# Patient Record
Sex: FEMALE | Race: WHITE | NOT HISPANIC OR LATINO | Employment: FULL TIME | URBAN - METROPOLITAN AREA
[De-identification: names, ages, dates, MRNs, and addresses within clinical notes are randomized per-mention and may not be internally consistent; named-entity substitution may affect disease eponyms.]

---

## 2017-01-11 ENCOUNTER — PATIENT MESSAGE (OUTPATIENT)
Dept: OBSTETRICS AND GYNECOLOGY | Facility: CLINIC | Age: 32
End: 2017-01-11

## 2017-03-17 ENCOUNTER — PATIENT MESSAGE (OUTPATIENT)
Dept: OBSTETRICS AND GYNECOLOGY | Facility: CLINIC | Age: 32
End: 2017-03-17

## 2017-04-17 ENCOUNTER — PATIENT MESSAGE (OUTPATIENT)
Dept: OPTOMETRY | Facility: CLINIC | Age: 32
End: 2017-04-17

## 2017-05-22 ENCOUNTER — OFFICE VISIT (OUTPATIENT)
Dept: OBSTETRICS AND GYNECOLOGY | Facility: CLINIC | Age: 32
End: 2017-05-22
Attending: OBSTETRICS & GYNECOLOGY
Payer: COMMERCIAL

## 2017-05-22 VITALS
DIASTOLIC BLOOD PRESSURE: 72 MMHG | BODY MASS INDEX: 38.42 KG/M2 | HEIGHT: 65 IN | SYSTOLIC BLOOD PRESSURE: 118 MMHG | WEIGHT: 230.63 LBS

## 2017-05-22 DIAGNOSIS — Z01.419 ENCOUNTER FOR GYNECOLOGICAL EXAMINATION WITHOUT ABNORMAL FINDING: ICD-10-CM

## 2017-05-22 DIAGNOSIS — Z12.4 SCREENING FOR MALIGNANT NEOPLASM OF THE CERVIX: Primary | ICD-10-CM

## 2017-05-22 PROCEDURE — 99395 PREV VISIT EST AGE 18-39: CPT | Mod: S$GLB,,, | Performed by: OBSTETRICS & GYNECOLOGY

## 2017-05-22 PROCEDURE — 99999 PR PBB SHADOW E&M-EST. PATIENT-LVL III: CPT | Mod: PBBFAC,,, | Performed by: OBSTETRICS & GYNECOLOGY

## 2017-05-22 PROCEDURE — 88175 CYTOPATH C/V AUTO FLUID REDO: CPT

## 2017-05-22 NOTE — PROGRESS NOTES
SUBJECTIVE:   31 y.o. female   for annual routine Pap and checkup. Patient's last menstrual period was 05/15/2017..  She has no unusual complaints and reports her cycles are now 21 days. .        Past Medical History:   Diagnosis Date    Allergic rhinitis      Past Surgical History:   Procedure Laterality Date    ADENOIDECTOMY      palstic surgery on her head as a child      scalp hair tiessue expander brith defect repair     TONSILLECTOMY       Social History     Social History    Marital status: Single     Spouse name: N/A    Number of children: N/A    Years of education: N/A     Occupational History    Not on file.     Social History Main Topics    Smoking status: Never Smoker    Smokeless tobacco: Never Used    Alcohol use Yes      Comment: socially    Drug use: No    Sexual activity: Yes     Partners: Male     Birth control/ protection: OCP     Other Topics Concern    Not on file     Social History Narrative    No narrative on file     Family History   Problem Relation Age of Onset    Hypertension Mother     Diabetes Mother     Hyperlipidemia Mother     Hypertension Father     Diabetes Father     Hyperlipidemia Father     Heart attack Maternal Grandfather     Diabetes Paternal Grandfather     Heart disease Paternal Grandfather     Heart attack Paternal Grandfather     Cancer Maternal Grandmother      non hodkins lymphoma     Cancer Paternal Grandmother      breast and cervical cancer     Heart disease Paternal Grandmother     Hyperlipidemia Paternal Grandmother     Dementia Paternal Grandmother     Amblyopia Neg Hx     Blindness Neg Hx     Macular degeneration Neg Hx     Retinal detachment Neg Hx     Strabismus Neg Hx     Stroke Neg Hx     Thyroid disease Neg Hx     Colon cancer Neg Hx     Ovarian cancer Neg Hx      OB History    Para Term  AB Living   0        SAB TAB Ectopic Multiple Live Births                      Current Outpatient Prescriptions    Medication Sig Dispense Refill    fluticasone (FLONASE) 50 mcg/actuation nasal spray 1 spray by Each Nare route daily as needed for Rhinitis. 16 g 6    norgestimate-ethinyl estradiol (ORTHO-CYCLEN) 0.25-35 mg-mcg per tablet Take 1 tablet by mouth once daily.       No current facility-administered medications for this visit.      Allergies: Penicillins     ROS:  Constitutional: no weight loss, weight gain, fever, fatigue  Eyes:  No vision changes, glasses/contacts  ENT/Mouth: No ulcers, sinus problems, ears ringing, headache  Cardiovascular: No inability to lie flat, chest pain, exercise intolerance, swelling, heart palpitations  Respiratory: No wheezing, coughing blood, shortness of breath, or cough  Gastrointestinal: No diarrhea, bloody stool, nausea/vomiting, constipation, gas, hemorrhoids  Genitourinary: No blood in urine, painful urination, urgency of urination, frequency of urination, incomplete emptying, incontinence, abnormal bleeding, painful periods, heavy periods, vaginal discharge, vaginal odor, painful intercourse, sexual problems, bleeding after intercourse.  Musculoskeletal: No muscle weakness  Skin/Breast: No painful breasts, nipple discharge, masses, rash, ulcers  Neurological: No passing out, seizures, numbness, headache  Endocrine: No diabetes, hypothyroid, hyperthyroid, hot flashes, hair loss, abnormal hair growth, acne  Psychiatric: No depression, crying  Hematologic: No bruises, bleeding, swollen lymph nodes, anemia.      Physical Exam:   Constitutional: She is oriented to person, place, and time. She appears well-developed and well-nourished.      Neck: Normal range of motion. No tracheal deviation present. No thyromegaly present.    Cardiovascular: Exam reveals no edema.     Pulmonary/Chest: Effort normal. She exhibits no mass, no tenderness, no deformity and no retraction. Right breast exhibits no inverted nipple, no mass, no nipple discharge, no skin change, no tenderness, presence, no  bleeding and no swelling. Left breast exhibits no inverted nipple, no mass, no nipple discharge, no skin change, no tenderness, presence, no bleeding and no swelling. Breasts are symmetrical.        Abdominal: Soft. She exhibits no distension and no mass. There is no tenderness. There is no rebound and no guarding. No hernia. Hernia confirmed negative in the left inguinal area.     Genitourinary: Vagina normal and uterus normal. Rectal exam shows no external hemorrhoid. There is no rash, tenderness or lesion on the right labia. There is no rash, tenderness or lesion on the left labia. Uterus is not deviated. Cervix is normal. No no adexnal prolapse. Right adnexum displays no mass, no tenderness and no fullness. Left adnexum displays no mass, no tenderness and no fullness. No tenderness, bleeding, rectocele, cystocele or unspecified prolapse of vaginal walls in the vagina. No vaginal discharge found. Cervix exhibits no motion tenderness, no discharge and no friability.           Musculoskeletal: Normal range of motion and moves all extremeties. She exhibits no edema.      Lymphadenopathy:        Right: No inguinal adenopathy present.        Left: No inguinal adenopathy present.    Neurological: She is alert and oriented to person, place, and time.    Skin: No rash noted. No erythema. No pallor.    Psychiatric: She has a normal mood and affect. Her behavior is normal. Judgment and thought content normal.         ASSESSMENT:   well woman  no contraindication to begin use of oral contraceptives    PLAN:   pap smear  return annually or prn  Patient desires refills of OCPs- she may or may not start them

## 2017-05-30 ENCOUNTER — OFFICE VISIT (OUTPATIENT)
Dept: OPTOMETRY | Facility: CLINIC | Age: 32
End: 2017-05-30

## 2017-05-30 ENCOUNTER — OFFICE VISIT (OUTPATIENT)
Dept: OPTOMETRY | Facility: CLINIC | Age: 32
End: 2017-05-30
Payer: COMMERCIAL

## 2017-05-30 DIAGNOSIS — H52.203 MYOPIA WITH ASTIGMATISM, BILATERAL: ICD-10-CM

## 2017-05-30 DIAGNOSIS — Z01.00 EXAMINATION OF EYES AND VISION: Primary | ICD-10-CM

## 2017-05-30 DIAGNOSIS — H52.13 MYOPIA WITH ASTIGMATISM, BILATERAL: ICD-10-CM

## 2017-05-30 DIAGNOSIS — Z46.0 ENCOUNTER FOR FITTING OR ADJUSTMENT OF SPECTACLES OR CONTACT LENSES: Primary | ICD-10-CM

## 2017-05-30 PROCEDURE — 92015 DETERMINE REFRACTIVE STATE: CPT | Mod: S$GLB,,, | Performed by: OPTOMETRIST

## 2017-05-30 PROCEDURE — 99999 PR PBB SHADOW E&M-EST. PATIENT-LVL III: CPT | Mod: PBBFAC,,, | Performed by: OPTOMETRIST

## 2017-05-30 PROCEDURE — 99499 UNLISTED E&M SERVICE: CPT | Mod: S$GLB,,, | Performed by: OPTOMETRIST

## 2017-05-30 PROCEDURE — 92310 CONTACT LENS FITTING OU: CPT | Mod: S$GLB,,, | Performed by: OPTOMETRIST

## 2017-05-30 PROCEDURE — 92014 COMPRE OPH EXAM EST PT 1/>: CPT | Mod: S$GLB,,, | Performed by: OPTOMETRIST

## 2017-05-30 NOTE — PATIENT INSTRUCTIONS
Good ocular health in each eye.  High myopia with astigmatism in each eye (greater astigmatic correction in the right eye).  Very satisfactory correctable visual acuity in each eye.  New spectacle lens Rx issued for use in lieu of CLs.    Wearing toric SCLs.  Good CL fit in each eye.  Wearing CLs well in each eye.  Showing need for only minor power change in the right CL, per over-refraction done today.  Power of left lens okay as is.  New contact lens Rx issued.    Recheck in one year, or prior if any problems in the interim.

## 2017-05-30 NOTE — PROGRESS NOTES
HPI     Concerns About Ocular Health    Additional comments: Eye exam and refraction, and contact lens follow-up.              Comments   Patient's age: 31 y.o. female  Occupation: [employer]  Approximate date of last eye examination:  10/13/2015  Name of last eye doctor seen: Dr Palmer  City/State: [employer] [facility]  Wears glasses? Yes     If yes, wears  Full-time or part-time?  Part time  Present glasses are: Bifocal, SV Distance, SV Reading?  Single vision   distance   Approximate age of present glasses:  2 years   Got new glasses following last exam, or subsequently?:  Yes   Any problem with [facility] with glasses?  No  Wears CLs?:  Yes           If yes:              Type of CL worn:  Acuvue Oasys for Astigmatism                                                OD  8.6   14.5   -7.50 -1.75   x 160                                               OS   8.6   14.5   -7.50 -0.75   x 040              Wears full-time or part-time:  Full-time               Sleeps with contact lenses:  No               CL Solution used:  Opti-free Replenish               How often replace CLs:  Monthly              Any problem with [facility] with CLs?  No              Has patient read and signed the contact lens fee information   document? Yes  Headaches?  No  Eye pain/discomfort? No                                                                            Flashes?  No  Floaters?  No  Diplopia/Double vision?  No  Patient's Ocular History:         Any eye surgeries? No         Any eye injury?  No         Any treatment for eye disease?  No  Family history of eye disease? No  Significant patient medical history:         1. Diabetes?  No   2. HBP?  No                 ! OTC eyedrops currently using:  Systane usually BID OU   ! Prescription eye meds currently using:  None   ! Any history of allergy/adverse reaction to any eye meds used   previously?  No    ! Any history of allergy/adverse reaction to eyedrops used during prior   eye exam(s)? No    ! Any history of  allergy/adverse reaction to Novacaine or similar meds?   No   ! Any history of allergy/adverse reaction to Epinephrine or similar meds?   No    ! Patient okay with use of anesthetic eyedrops to check eye pressure?    Yes        ! Patient okay with use of eyedrops to dilate pupils today?  Yes, use a   mild drop   !  Allergies/Medications/Medical History/Family History reviewed today?    Yes      PD =   58/54                                                                      Last edited by Aamir Palmer, OD on 5/30/2017 10:40 AM. (History)            Assessment /Plan     For exam results, see Encounter Report.    1. Examination of eyes and vision     2. Myopia with astigmatism, bilateral                      Good ocular health in each eye.  High myopia with astigmatism in each eye (greater astigmatic correction in the right eye).  Very satisfactory correctable visual acuity in each eye.  New spectacle lens Rx issued for use in lieu of CLs.    Wearing toric SCLs.  Good CL fit in each eye.  Wearing CLs well in each eye.  Showing need for only minor power change in the right CL, per over-refraction done today.  Power of left lens okay as is.  New contact lens Rx issued.    Recheck in one year, or prior if any problems in the interim.

## 2017-05-30 NOTE — PROGRESS NOTES
HPI     Contact Lens Follow Up    Additional comments: contact lens follow-up with general eye exam           Comments   Patient in today for contact lens follow-up with general eye examination.    Refer to additional patient encounter notes dated 05/30/2017.         Last edited by Aamir Palmer, OD on 5/30/2017 10:35 AM. (History)            Assessment /Plan     For exam results, see Encounter Report.    1. Encounter for fitting or adjustment of spectacles or contact lenses                      Good ocular health in each eye.  High myopia with astigmatism in each eye (greater astigmatic correction in the right eye).  Very satisfactory correctable visual acuity in each eye.  New spectacle lens Rx issued for use in lieu of CLs.    Wearing toric SCLs.  Good CL fit in each eye.  Wearing CLs well in each eye.  Showing need for only minor power change in the right CL, per over-refraction done today.  Power of left lens okay as is.  New contact lens Rx issued.    Recheck in one year, or prior if any problems in the interim.

## 2017-08-29 ENCOUNTER — PATIENT MESSAGE (OUTPATIENT)
Dept: OBSTETRICS AND GYNECOLOGY | Facility: CLINIC | Age: 32
End: 2017-08-29

## 2017-10-12 ENCOUNTER — OFFICE VISIT (OUTPATIENT)
Dept: SURGERY | Facility: CLINIC | Age: 32
End: 2017-10-12
Payer: COMMERCIAL

## 2017-10-12 DIAGNOSIS — Z80.3 FAMILY HISTORY OF BREAST CANCER: ICD-10-CM

## 2017-10-12 DIAGNOSIS — Z71.83 ENCOUNTER FOR NONPROCREATIVE GENETIC COUNSELING: Primary | ICD-10-CM

## 2017-10-12 PROCEDURE — 99202 OFFICE O/P NEW SF 15 MIN: CPT | Mod: S$GLB,,, | Performed by: NURSE PRACTITIONER

## 2017-10-12 NOTE — PROGRESS NOTES
Mrs Akers presents for genetic counseling, self referred, also patient of Dr Fermin. She is a 31 year old female with no personal history of cancer. See pedigree for full family history which will be scanned into Epic media. Her paternal family is from [place] with her living great paternal aunts residing in that country. This patient does not have a known hereditary cancer genetic mutation on either side of the family and does not have known Ashenazi Sikh ancestry.      We reviewed her medical and family history and discussed the genetics of breast cancer, cancer risks associated with a hereditary predisposition to cancer, and the benefits, risks, and limitations of genetic testing according to current NCCN guidelines.  Discussed sporadic verses family clustering verses hereditary cancer. The patients paternal family history raises the possibility of a genetic susceptibility to breast cancer and ovarian cancer, with the two genes most strongly associated with early-onset breast cancer and ovarian cancer being BRCA1 and BRCA2. Mutations in these genes increase the risk for both breast and ovarian cancer in women and breast and early prostate cancer in men, along with an elevated risk of pancreatic cancer and melanoma. Due to significant clinical overlap in hereditary cancer susceptibility genes, a molecular diagnosis of a hereditary cancer condition is necessary to clarify the cancer risks this patient and multigene testing was discussed based on her history of malignancies reported today. Genetic testing for mutations in the BRCA1 and BRCA2 genes involves the complete sequencing of both BRCA1 and BRCA2, testing for 5 large gene rearrangement mutations in the BRCA1 gene, and the BRACAnalysis Large Rearrangement Test (LEE accounts for 6-10% of all mutations in HBOC). This testing is estimated to identify greater than 92% of mutations in the BRCA1 and BRCA2 genes.     Initial testing is recommended for living  affected relative with early onset breast cancer (paternal great aunt) however she does not live in the [place] and this patient states she is uncertain if genetic testing is even available in [place]. The next most appropriate relative to pursue genetic testing would be a first degree relative to those affected with at risk cancers (her father or paternal aunts). Discussed at great length rationale for this recommendation and current NCCN guidelines. Also discussed limitations of a negative result in someone unaffected with cancer. She states she will discuss with her father and contact me if he desires an appointment with me for genetic counseling.     We also discussed her maternal family history is suggestive of a sporadic occurrence of breast cancer in her mother and that genetic testing would not be recommended for her maternal family.     Also discussed breast cancer screening recommendations according to current NCCN and ACS guidelines. Unless a known inherited pathogenic mutation is detected in this patient, I advised annual mammogram at 39 and may also consider breast MRI at that time. No indication to begin those screenings earlier based on her first degree relative's age of onset (mother at 61).     She will contact me with any results of genetic testing in her family. If her father is unavailable for testing, I would proceed with testing this patient and she was informed her insurance may or may not cover the cost of testing.     Time in counseling 40 min, total time 40 min

## 2017-11-15 ENCOUNTER — PATIENT MESSAGE (OUTPATIENT)
Dept: INTERNAL MEDICINE | Facility: CLINIC | Age: 32
End: 2017-11-15

## 2017-11-16 DIAGNOSIS — Z00.00 ANNUAL PHYSICAL EXAM: Primary | ICD-10-CM

## 2017-11-20 ENCOUNTER — LAB VISIT (OUTPATIENT)
Dept: LAB | Facility: HOSPITAL | Age: 32
End: 2017-11-20
Attending: INTERNAL MEDICINE
Payer: COMMERCIAL

## 2017-11-20 DIAGNOSIS — Z00.00 ANNUAL PHYSICAL EXAM: ICD-10-CM

## 2017-11-20 LAB
ALBUMIN SERPL BCP-MCNC: 3.6 G/DL
ALP SERPL-CCNC: 61 U/L
ALT SERPL W/O P-5'-P-CCNC: 11 U/L
ANION GAP SERPL CALC-SCNC: 6 MMOL/L
AST SERPL-CCNC: 14 U/L
BASOPHILS # BLD AUTO: 0.06 K/UL
BASOPHILS NFR BLD: 0.6 %
BILIRUB DIRECT SERPL-MCNC: 0.2 MG/DL
BILIRUB SERPL-MCNC: 0.4 MG/DL
BUN SERPL-MCNC: 11 MG/DL
CALCIUM SERPL-MCNC: 9.3 MG/DL
CHLORIDE SERPL-SCNC: 103 MMOL/L
CHOLEST SERPL-MCNC: 226 MG/DL
CHOLEST/HDLC SERPL: 4.7 {RATIO}
CO2 SERPL-SCNC: 28 MMOL/L
CREAT SERPL-MCNC: 0.7 MG/DL
DIFFERENTIAL METHOD: ABNORMAL
EOSINOPHIL # BLD AUTO: 0.2 K/UL
EOSINOPHIL NFR BLD: 1.7 %
ERYTHROCYTE [DISTWIDTH] IN BLOOD BY AUTOMATED COUNT: 13.1 %
EST. GFR  (AFRICAN AMERICAN): >60 ML/MIN/1.73 M^2
EST. GFR  (NON AFRICAN AMERICAN): >60 ML/MIN/1.73 M^2
ESTIMATED AVG GLUCOSE: 111 MG/DL
GLUCOSE SERPL-MCNC: 109 MG/DL
HBA1C MFR BLD HPLC: 5.5 %
HCT VFR BLD AUTO: 36.4 %
HDLC SERPL-MCNC: 48 MG/DL
HDLC SERPL: 21.2 %
HGB BLD-MCNC: 11.7 G/DL
IMM GRANULOCYTES # BLD AUTO: 0.06 K/UL
IMM GRANULOCYTES NFR BLD AUTO: 0.6 %
LDLC SERPL CALC-MCNC: 134.8 MG/DL
LYMPHOCYTES # BLD AUTO: 3 K/UL
LYMPHOCYTES NFR BLD: 29.7 %
MCH RBC QN AUTO: 27.1 PG
MCHC RBC AUTO-ENTMCNC: 32.1 G/DL
MCV RBC AUTO: 85 FL
MONOCYTES # BLD AUTO: 0.6 K/UL
MONOCYTES NFR BLD: 6 %
NEUTROPHILS # BLD AUTO: 6.1 K/UL
NEUTROPHILS NFR BLD: 61.4 %
NONHDLC SERPL-MCNC: 178 MG/DL
NRBC BLD-RTO: 0 /100 WBC
PLATELET # BLD AUTO: 295 K/UL
PMV BLD AUTO: 9.1 FL
POTASSIUM SERPL-SCNC: 4.1 MMOL/L
PROT SERPL-MCNC: 7.6 G/DL
RBC # BLD AUTO: 4.31 M/UL
SODIUM SERPL-SCNC: 137 MMOL/L
TRIGL SERPL-MCNC: 216 MG/DL
TSH SERPL DL<=0.005 MIU/L-ACNC: 1.12 UIU/ML
WBC # BLD AUTO: 9.97 K/UL

## 2017-11-20 PROCEDURE — 80061 LIPID PANEL: CPT

## 2017-11-20 PROCEDURE — 80048 BASIC METABOLIC PNL TOTAL CA: CPT

## 2017-11-20 PROCEDURE — 36415 COLL VENOUS BLD VENIPUNCTURE: CPT

## 2017-11-20 PROCEDURE — 85025 COMPLETE CBC W/AUTO DIFF WBC: CPT

## 2017-11-20 PROCEDURE — 80076 HEPATIC FUNCTION PANEL: CPT

## 2017-11-20 PROCEDURE — 84443 ASSAY THYROID STIM HORMONE: CPT

## 2017-11-20 PROCEDURE — 83036 HEMOGLOBIN GLYCOSYLATED A1C: CPT

## 2017-12-05 ENCOUNTER — OFFICE VISIT (OUTPATIENT)
Dept: INTERNAL MEDICINE | Facility: CLINIC | Age: 32
End: 2017-12-05
Payer: COMMERCIAL

## 2017-12-05 VITALS
WEIGHT: 226.19 LBS | HEART RATE: 84 BPM | SYSTOLIC BLOOD PRESSURE: 132 MMHG | DIASTOLIC BLOOD PRESSURE: 64 MMHG | HEIGHT: 65 IN | BODY MASS INDEX: 37.69 KG/M2

## 2017-12-05 DIAGNOSIS — D64.9 ANEMIA, UNSPECIFIED TYPE: ICD-10-CM

## 2017-12-05 DIAGNOSIS — Z00.00 ANNUAL PHYSICAL EXAM: Primary | ICD-10-CM

## 2017-12-05 DIAGNOSIS — J31.0 RHINITIS, UNSPECIFIED CHRONICITY, UNSPECIFIED TYPE: ICD-10-CM

## 2017-12-05 DIAGNOSIS — R00.2 PALPITATION: ICD-10-CM

## 2017-12-05 PROCEDURE — 99999 PR PBB SHADOW E&M-EST. PATIENT-LVL III: CPT | Mod: PBBFAC,,, | Performed by: INTERNAL MEDICINE

## 2017-12-05 PROCEDURE — 99395 PREV VISIT EST AGE 18-39: CPT | Mod: S$GLB,,, | Performed by: INTERNAL MEDICINE

## 2017-12-05 RX ORDER — FLUTICASONE PROPIONATE 50 MCG
1 SPRAY, SUSPENSION (ML) NASAL DAILY PRN
Qty: 16 G | Refills: 6 | Status: SHIPPED | OUTPATIENT
Start: 2017-12-05

## 2017-12-05 NOTE — PROGRESS NOTES
"Subjective:       Patient ID: Cami Akers is a 31 y.o. female.    Chief Complaint: Annual Exam   31 year old presents for physica. She has been well overall realized she had not been in for some time. She wanted to heave her thyroid checked and get some bloodwork. She  Had episode of palpiation last month . She cut back on caffeine and stopped it. Her symtpoms resolved. She denies cp or sob.   She has occasional seasonal allergies relief with flonase would like refill. She did undergo genetic appt for counseing she reports sent by her gyn due to family history but it was mostly on her fathers side. 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   Respiratory: Negative for cough, shortness of breath and wheezing.    Cardiovascular: Negative for chest pain.        Episode of palpiatoins last month resolved  With stopping caffein   Gastrointestinal: Negative for abdominal pain and constipation.   Skin:        Dry skin two toes   Bleeds at times    Neurological: Negative for dizziness.       Objective:     Blood pressure 132/64, pulse 84, height 5' 5" (1.651 m), weight 102.6 kg (226 lb 3.1 oz).    Physical Exam   Constitutional: No distress.   HENT:   Head: Normocephalic.   Mouth/Throat: Oropharynx is clear and moist.   Eyes: No scleral icterus.   Neck: Neck supple.   Cardiovascular: Normal rate, regular rhythm and normal heart sounds.  Exam reveals no gallop and no friction rub.    No murmur heard.  Pulmonary/Chest: Effort normal and breath sounds normal. No respiratory distress.   Breast : normal no masses or tenderness    Abdominal: Soft. Bowel sounds are normal. She exhibits no mass. There is no tenderness.   Musculoskeletal: She exhibits no edema.   Neurological: She is alert.   Skin: No erythema.   Dry hypertrophic skin two toes  Right    Psychiatric: She has a normal mood and affect.   Vitals reviewed.      Assessment:     "   1. Annual physical exam    2. Rhinitis, unspecified chronicity, unspecified type    3. Anemia, unspecified type    4. Palpitation        Plan:       Cami was seen today for annual exam.    Diagnoses and all orders for this visit:    Annual physical exam    Rhinitis, unspecified chronicity, unspecified type  -     fluticasone (FLONASE) 50 mcg/actuation nasal spray; 1 spray by Each Nare route daily as needed for Rhinitis.    Anemia, unspecified type  Discussed consider mvi with iron  Recheck in 2 months   -     CBC auto differential; Future  -     Iron and TIBC; Future  -     Ferritin; Future    Palpitation  Recent episode of resolved off of caffeine  Discussed ekg/holter for further evaluation she declines but will call to proceed if sympotms recur   Hydration and caffeine reduction as she has been     Discussed healthy diet regular exercise     Discussed consider update adacel rx proviedd  She had her annual flu shot     She continues to follow with gyn and up to date on her gyn exam

## 2018-02-27 ENCOUNTER — PATIENT MESSAGE (OUTPATIENT)
Dept: INTERNAL MEDICINE | Facility: CLINIC | Age: 33
End: 2018-02-27

## 2018-02-28 ENCOUNTER — LAB VISIT (OUTPATIENT)
Dept: LAB | Facility: HOSPITAL | Age: 33
End: 2018-02-28
Attending: INTERNAL MEDICINE
Payer: COMMERCIAL

## 2018-02-28 DIAGNOSIS — D64.9 ANEMIA, UNSPECIFIED TYPE: ICD-10-CM

## 2018-02-28 LAB
BASOPHILS # BLD AUTO: 0.05 K/UL
BASOPHILS NFR BLD: 0.4 %
DIFFERENTIAL METHOD: ABNORMAL
EOSINOPHIL # BLD AUTO: 0.2 K/UL
EOSINOPHIL NFR BLD: 1.3 %
ERYTHROCYTE [DISTWIDTH] IN BLOOD BY AUTOMATED COUNT: 13.2 %
FERRITIN SERPL-MCNC: 183 NG/ML
HCT VFR BLD AUTO: 36.5 %
HGB BLD-MCNC: 11.8 G/DL
IMM GRANULOCYTES # BLD AUTO: 0.03 K/UL
IMM GRANULOCYTES NFR BLD AUTO: 0.2 %
IRON SERPL-MCNC: 41 UG/DL
LYMPHOCYTES # BLD AUTO: 4.2 K/UL
LYMPHOCYTES NFR BLD: 34.6 %
MCH RBC QN AUTO: 27.4 PG
MCHC RBC AUTO-ENTMCNC: 32.3 G/DL
MCV RBC AUTO: 85 FL
MONOCYTES # BLD AUTO: 0.7 K/UL
MONOCYTES NFR BLD: 5.6 %
NEUTROPHILS # BLD AUTO: 7.1 K/UL
NEUTROPHILS NFR BLD: 57.9 %
NRBC BLD-RTO: 0 /100 WBC
PLATELET # BLD AUTO: 312 K/UL
PMV BLD AUTO: 9.5 FL
RBC # BLD AUTO: 4.3 M/UL
SATURATED IRON: 11 %
TOTAL IRON BINDING CAPACITY: 369 UG/DL
TRANSFERRIN SERPL-MCNC: 249 MG/DL
WBC # BLD AUTO: 12.24 K/UL

## 2018-02-28 PROCEDURE — 83540 ASSAY OF IRON: CPT

## 2018-02-28 PROCEDURE — 36415 COLL VENOUS BLD VENIPUNCTURE: CPT

## 2018-02-28 PROCEDURE — 82728 ASSAY OF FERRITIN: CPT

## 2018-02-28 PROCEDURE — 85025 COMPLETE CBC W/AUTO DIFF WBC: CPT

## 2018-08-19 ENCOUNTER — PATIENT MESSAGE (OUTPATIENT)
Dept: OBSTETRICS AND GYNECOLOGY | Facility: CLINIC | Age: 33
End: 2018-08-19